# Patient Record
Sex: FEMALE | Race: WHITE | NOT HISPANIC OR LATINO | Employment: FULL TIME | URBAN - METROPOLITAN AREA
[De-identification: names, ages, dates, MRNs, and addresses within clinical notes are randomized per-mention and may not be internally consistent; named-entity substitution may affect disease eponyms.]

---

## 2020-03-17 ENCOUNTER — NURSE TRIAGE (OUTPATIENT)
Dept: OTHER | Facility: OTHER | Age: 30
End: 2020-03-17

## 2020-03-17 NOTE — TELEPHONE ENCOUNTER
Regarding: [id number]  ----- Message from Nguyen Garnica sent at 3/17/2020 12:27 PM EDT -----  "My Steve Alberts is quarantined, however I have no symptoms but I have been exposed (message taken by Radha Lieberman)  "

## 2020-03-17 NOTE — TELEPHONE ENCOUNTER
----- Message from Dennard Gitelman sent at 3/17/2020  2:14 PM EDT -----  I am returning a call from the RN

## 2020-03-17 NOTE — TELEPHONE ENCOUNTER
Reason for Disposition   [1] No COVID-19 EXPOSURE BUT [2] questions about    Answer Assessment - Initial Assessment Questions  1  PLACE of CONTACT: "Where were you when you were exposed to COVID-19  (coronavirus disease 2019)?" (e g , city, state, country)      Boss's cousin and her  were both diagnosed COVID-23  Her boss later was diagnosed with the virus  Patient has not been in contact with her boss for at least a week  Her boss was exposed to her cousin a few days before that  No travel international or domestic  2  TYPE of CONTACT: "How much contact was there?" (e g , live in same house, work in same office, same school)      In same room with her boss over a week ago  3  DATE of CONTACT: "When did you have contact with a coronavirus patient?" (e g , days)      Approximately a week ago   4  DURATION of CONTACT: "How long were you in contact with the COVID-19 (coronavirus disease) patient?" (e g , a few seconds, passed by person, a few minutes, live with the patient)      Less than 5 minutes  5  SYMPTOMS: "Do you have any symptoms?" (e g , fever, cough, breathing difficulty)      Intermittent, dry cough for about a month  Denies fever or SOB  [facility] [place] [facility]  PREGNANCY OR POSTPARTUM: "Is there any chance you are pregnant?" "When was your last menstrual period?" "Did you deliver in the last 2 weeks?"      LMP was 1 week ago  7  HIGH RISK: "Do you have any heart or lung problems? Do you have a weakened immune system?" (e g , CHF, COPD, asthma, HIV positive, chemotherapy, renal failure, diabetes mellitus, sickle cell anemia)      Denies heart disease  Has asthma  Denies warekened immune system      Protocols used: CORONAVIRUS (COVID-19) EXPOSURE-ADULT-

## 2020-10-24 ENCOUNTER — OFFICE VISIT (OUTPATIENT)
Dept: URGENT CARE | Age: 30
End: 2020-10-24
Payer: COMMERCIAL

## 2020-10-24 VITALS — RESPIRATION RATE: 18 BRPM | TEMPERATURE: 98 F | HEART RATE: 92 BPM | OXYGEN SATURATION: 97 %

## 2020-10-24 DIAGNOSIS — Z20.822 ENCOUNTER FOR LABORATORY TESTING FOR COVID-19 VIRUS: Primary | ICD-10-CM

## 2020-10-24 PROCEDURE — 99213 OFFICE O/P EST LOW 20 MIN: CPT | Performed by: PHYSICIAN ASSISTANT

## 2020-10-24 PROCEDURE — U0003 INFECTIOUS AGENT DETECTION BY NUCLEIC ACID (DNA OR RNA); SEVERE ACUTE RESPIRATORY SYNDROME CORONAVIRUS 2 (SARS-COV-2) (CORONAVIRUS DISEASE [COVID-19]), AMPLIFIED PROBE TECHNIQUE, MAKING USE OF HIGH THROUGHPUT TECHNOLOGIES AS DESCRIBED BY CMS-2020-01-R: HCPCS | Performed by: PHYSICIAN ASSISTANT

## 2020-10-27 LAB — SARS-COV-2 RNA SPEC QL NAA+PROBE: NOT DETECTED
